# Patient Record
(demographics unavailable — no encounter records)

---

## 2025-07-02 NOTE — HISTORY OF PRESENT ILLNESS
[Patient reported PAP Smear was normal] : Patient reported PAP Smear was normal [Y] : Patient is sexually active [N] : Patient denies prior pregnancies [Hx HPV] : history of HPV [Currently Active] : currently active [FreeTextEntry1] : 26 y/o presented for follow up from hospitalization from [facility] For TOA vs endometriosis s/p IV antibiotics anf 14 days of oral antibiotics doxy/ flagyl at the time pt presented with upper abdominal pain no fever no pelvic pain  pt states she had same type of pain August 2024 was evaluate at [facility] imaging not done was prescribed muscle relaxant / April 2025 never went to the hospital and then in June again WBC on admission 14 went down to 7  neg cultures on evaluation today no pelvic pain   [PapSmeardate] : 1/2025 [LMPDate] : 6/24/25 [PGHxTotal] : 0 [TextBox_27] : MRI: 6/22/25, CT: 6/19/25, SONO: 6/19/25 [TextBox_36] : hx of colpo-low grade per pt [TextBox_9] : 11 [TextBox_13] : 28 [TextBox_15] : 3-4

## 2025-07-02 NOTE — PLAN
[FreeTextEntry1] : TOA vs endometriosis will start low dose OCP and repeat Sono for change in adnexal mass  f/u in 2 wks

## 2025-07-21 NOTE — HISTORY OF PRESENT ILLNESS
[Patient reported PAP Smear was normal] : Patient reported PAP Smear was normal [Y] : Patient is sexually active [N] : Patient denies prior pregnancies [Hx HPV] : history of HPV [Currently Active] : currently active [FreeTextEntry1] : 25 year old F, former patient of Dr. Josh Sheffield, presenting for follow up. She was initially seen in the ED where she had a PUS/CT A/P/MRI pelvis performed and diagnosed with suspected TOA. She was appropriated treated and has completed all antibiotics. She saw Dr. Dorantes in the outpatient setting and was started on CHC -- suspicion for endometriosis given clinical presentation. She is taking her CHC as prescribed. She states she is not having fevers, chills, abdominopelvic pain, no GI or bladder symptoms, no pain with intercourse, menses are regular. ROS negative.   ACC: [id number]     EXAM:  CT ABDOMEN AND PELVIS IC   ORDERED BY: SUELLEN DUVALL PROCEDURE DATE:  06/19/2025  INTERPRETATION:  CLINICAL INFORMATION: Epigastric pain. Suprapubic. COMPARISON: None.  CONTRAST/COMPLICATIONS: IV Contrast: Omnipaque 350  80 cc administered   20 cc discarded Oral Contrast: NONE.  PROCEDURE: CT of the Abdomen and Pelvis was performed. Sagittal and coronal reformats were performed.  FINDINGS: LOWER CHEST: Linear atelectasis or scarring at the lung bases.  LIVER: Within normal limits. BILE DUCTS: Normal caliber. GALLBLADDER: Distended with a normal caliber wall. SPLEEN: Within normal limits. PANCREAS: Within normal limits. ADRENALS: Within normal limits. KIDNEYS/URETERS: Within normal limits.  BLADDER: Unremarkable. REPRODUCTIVE ORGANS: Bilateral adnexal septated cystic masses measuring 6.5 x 4.4 cm in the right and 7.5 x 4.8 cm on the left (series 2 image 25) which are directly adjacent to one another in the midline of the pelvis.  BOWEL: No bowel obstruction. Appendix is normal. PERITONEUM/RETROPERITONEUM: Small amount of ascites including the inferior aspect of the liver and anterior aspect of the pelvis with infiltration of the fat in the anterior aspect of the pelvis VESSELS: Abdominal aorta normal in caliber. LYMPH NODES: No lymphadenopathy. ABDOMINAL WALL: Unremarkable. BONES: No aggressive osseous lesion  IMPRESSION: Bilateral adjacent septated adnexal masses measuring 6.5 x 4.4 cm on the right and 7.5 x 4.8 cm on the left with infiltration of the fat at the anterior aspect of the pelvis and small amount of ascites suspicious for pelvic inflammatory disease with bilateral tubo-ovarian abscesses; a pelvic ultrasound is recommended for further evaluation. The findings were discussed with Dr. Alatorre on 6/19/2025 8:32 PM --- End of Report ---  ACC: [id number]     EXAM:  US TRANSVAGINAL   ORDERED BY: LORIN GIL ACC: [id number]     EXAM:  US PELVIC COMPLETE   ORDERED BY: LORIN GIL PROCEDURE DATE:  06/19/2025 INTERPRETATION:  CLINICAL INFORMATION: Pelvic pain. Question of the TOA on earlier CT abdomen and pelvis. COMPARISON: Same day CT abdomen and pelvis. TECHNIQUE: Endovaginal and transabdominal pelvic sonogram. Color and Spectral Doppler was performed.  FINDINGS: Uterus: 7.4 x 5.4 x 4.8 cm. Within normal limits. Endometrium: 3 mm. Within normal limits.  Right ovary: Complex structures in the right adnexa demonstrating fluid fluid level corresponding to lesion seen on earlier CT. Several images suggest that this may represent dilated fallopian tube. The structure incompletely imaged due to its superior location, and thus difficult to completely included on transvaginal images. At the level scanned this measures approximately 6.1 x 4.7 cm.  Left ovary: Not visualized. Bowel gas limits evaluation of the left adnexa.  Fluid: Mild complex free fluid in the right adnexa..  IMPRESSION: Incomplete examination due to bowel gas and superior location of the ovaries. The left ovary is not visualized.  Partially imaged complex right ovarian lesion demonstrating a fluid fluid level. Findings may represent hematosalpinx/endometriosis with a small amount of right adnexal complex free fluid possibly representing hemoperitoneum. Differential includes tubo-ovarian abscesses. Consider pelvic MRI for more detailed evaluation. --- End of Report ---    ACC: [id number]     EXAM:  MR PELVIS WAW IC   ORDERED BY: ASAEL MURILLO ACC: [id number]     EXAM:  MR ABDOMEN WAW IC   ORDERED BY: TEMO OCONNOR PROCEDURE DATE:  06/22/2025 INTERPRETATION:  CLINICAL INFORMATION: Bilateral tubo-ovarian abscesses, on IV antibiotics. Surveillance. COMPARISON: CT of the abdomen and pelvis dated 06/19/2025 and pelvic ultrasound dated 06/19/2025  CONTRAST/COMPLICATIONS: IV Contrast: Gadavist  6 cc administered   1.5 cc discarded Oral Contrast: NONE.  PROCEDURE: MRI of the abdomen and pelvis was performed.  FINDINGS: LOWER CHEST: Linear atelectasis in the left lower lobe  LIVER: Within normal limits. BILE DUCTS: Normal caliber. GALLBLADDER: Within normal limits. SPLEEN: Within normal limits. PANCREAS: Within normal limits. ADRENALS: Within normal limits. KIDNEYS/URETERS: Within normal limits.  BLADDER: Within normal limits. REPRODUCTIVE ORGANS: * Slightly improved but persistent enlargement of bilateral adnexa with complex multilocular structures presumed to represent bilateral tubo-ovarian abscesses in light of the provided history. Both predominantly composed of T1 hyperintense contents with fluid levels/T2 shading. While findings may be consistent with proteinaceous material in the setting of abscesses, imaging differential would include endometriomas/hematosalpinx. A combination of findings could also be considered. Dominant component in the right adnexa measures 4.6 x 4.4 cm (9, 15), previously 5.0 x 4.3 cm when remeasured. Dominant multilocular component in the left adnexa measures 6.9 x 4.2 cm (9, 13), previously 7.1 x 3.8 cm. * Tethering of both adnexa medially to the posterior uterine margin and posteriorly towards the anterior wall of the upper rectum where there is spiculated T2 hypointense tissue with T1 hyperintense foci. Suspect endometriotic implants/pelvic adhesions * Uterus is normal in size and deviated into the left anterior hemipelvis.  BOWEL: No bowel obstruction As above, suspected adhesions between the adnexa and anterior rectal wall. PERITONEUM: Persistent inflammatory infiltration of the anterior pelvic fat new 4.3 x 2.8 cm organizing collection with incomplete rim enhancement. Fluid in the pelvis is also T1 hyperintense which may be due to proteinaceous or hemorrhagic material VESSELS: Within normal limits. RETROPERITONEUM/LYMPH NODES: No lymphadenopathy. ABDOMINAL WALL: Within normal limits. BONES: No aggressive osseous lesion.  IMPRESSION: 1.  Persistent but improved bilateral adnexal enlargement with multilocular structures presumed to represent bilateral tubo-ovarian abscesses in light of the patient's history. Note that differential would include endometrioma/hematosalpinx with or without superimposed infection. 2.  Features of endometriosis with suspected endometriotic implant/pelvic adhesions tethering both adnexa medially to the posterior uterine margin and anterior rectal wall. 3.  Persistent inflammatory infiltration and fluid within the anterior pelvic fat and new 4.3 x 2.8 cm organizing collection also with proteinaceous or hemorrhagic material. --- End of Report ---  EXAM: [id number] - US TRANSVAGINAL  - ORDERED BY: JOSH UVADYEVA PROCEDURE DATE:  07/09/2025 INTERPRETATION:  CLINICAL INFORMATION: TOA vs Endometriosis; LMP: 06/18/2025  COMPARISON: 6/19/2025  TECHNIQUE: Endovaginal pelvic sonogram as per order. Transabdominal pelvic sonogram was also performed as part of our standard protocol.  FINDINGS: Uterus: 7.6 cm x 4.3 cm x 5.4 cm. Endometrium: 13 mm.  Right ovary: 4.8 cm x 3.6 cm x 4.7 cm. 3.0 x 2.6 x 2.9 cm cyst within septation. Left ovary: 7.3 cm x 5.9 cm x 7.1 cm. 5.0 x 4.0 x 3.7 cm cyst with low-level echo and septation.  Fluid: None.  IMPRESSION: Complex bilateral adnexal cysts measuring up to 3.0 cm in the right and 5 cm in the left. --- End of Report --- [PapSmeardate] : 1/2025 [LMPDate] : 6/24/25 [PGHxTotal] : 0 [TextBox_27] : MRI: 6/22/25, CT: 6/19/25, SONO: 7/9/25 [TextBox_36] : hx of colpo-low grade per pt [TextBox_9] : 11 [TextBox_13] : 28 [TextBox_15] : 3-4

## 2025-07-21 NOTE — DISCUSSION/SUMMARY
[FreeTextEntry1] : - Bilateral complex adnexal masses have decreased in size from time of last imaging to now.  - Will have her continue the CHC and re-evaluate in 3 months with PUS  RTO for results

## 2025-07-21 NOTE — PHYSICAL EXAM
[Appropriately responsive] : appropriately responsive [Alert] : alert [No Acute Distress] : no acute distress [Regular Rate Rhythm] : regular rate rhythm [Oriented x3] : oriented x3 [FreeTextEntry5] : Breahting comfortably on room air

## 2025-07-21 NOTE — HISTORY OF PRESENT ILLNESS
[Patient reported PAP Smear was normal] : Patient reported PAP Smear was normal [Y] : Patient is sexually active [N] : Patient denies prior pregnancies [Hx HPV] : history of HPV [Currently Active] : currently active [FreeTextEntry1] : 25 year old F, former patient of Dr. Josh Sheffield, presenting for follow up. She was initially seen in the ED where she had a PUS/CT A/P/MRI pelvis performed and diagnosed with suspected TOA. She was appropriated treated and has completed all antibiotics. She saw Dr. Dorantes in the outpatient setting and was started on CHC -- suspicion for endometriosis given clinical presentation. She is taking her CHC as prescribed. She states she is not having fevers, chills, abdominopelvic pain, no GI or bladder symptoms, no pain with intercourse, menses are regular. ROS negative.   ACC: [id number]     EXAM:  CT ABDOMEN AND PELVIS IC   ORDERED BY: SUELLEN DUVALL PROCEDURE DATE:  06/19/2025  INTERPRETATION:  CLINICAL INFORMATION: Epigastric pain. Suprapubic. COMPARISON: None.  CONTRAST/COMPLICATIONS: IV Contrast: Omnipaque 350  80 cc administered   20 cc discarded Oral Contrast: NONE.  PROCEDURE: CT of the Abdomen and Pelvis was performed. Sagittal and coronal reformats were performed.  FINDINGS: LOWER CHEST: Linear atelectasis or scarring at the lung bases.  LIVER: Within normal limits. BILE DUCTS: Normal caliber. GALLBLADDER: Distended with a normal caliber wall. SPLEEN: Within normal limits. PANCREAS: Within normal limits. ADRENALS: Within normal limits. KIDNEYS/URETERS: Within normal limits.  BLADDER: Unremarkable. REPRODUCTIVE ORGANS: Bilateral adnexal septated cystic masses measuring 6.5 x 4.4 cm in the right and 7.5 x 4.8 cm on the left (series 2 image 25) which are directly adjacent to one another in the midline of the pelvis.  BOWEL: No bowel obstruction. Appendix is normal. PERITONEUM/RETROPERITONEUM: Small amount of ascites including the inferior aspect of the liver and anterior aspect of the pelvis with infiltration of the fat in the anterior aspect of the pelvis VESSELS: Abdominal aorta normal in caliber. LYMPH NODES: No lymphadenopathy. ABDOMINAL WALL: Unremarkable. BONES: No aggressive osseous lesion  IMPRESSION: Bilateral adjacent septated adnexal masses measuring 6.5 x 4.4 cm on the right and 7.5 x 4.8 cm on the left with infiltration of the fat at the anterior aspect of the pelvis and small amount of ascites suspicious for pelvic inflammatory disease with bilateral tubo-ovarian abscesses; a pelvic ultrasound is recommended for further evaluation. The findings were discussed with Dr. Alatorre on 6/19/2025 8:32 PM --- End of Report ---  ACC: [id number]     EXAM:  US TRANSVAGINAL   ORDERED BY: LROIN GIL ACC: [id number]     EXAM:  US PELVIC COMPLETE   ORDERED BY: LORIN GIL PROCEDURE DATE:  06/19/2025 INTERPRETATION:  CLINICAL INFORMATION: Pelvic pain. Question of the TOA on earlier CT abdomen and pelvis. COMPARISON: Same day CT abdomen and pelvis. TECHNIQUE: Endovaginal and transabdominal pelvic sonogram. Color and Spectral Doppler was performed.  FINDINGS: Uterus: 7.4 x 5.4 x 4.8 cm. Within normal limits. Endometrium: 3 mm. Within normal limits.  Right ovary: Complex structures in the right adnexa demonstrating fluid fluid level corresponding to lesion seen on earlier CT. Several images suggest that this may represent dilated fallopian tube. The structure incompletely imaged due to its superior location, and thus difficult to completely included on transvaginal images. At the level scanned this measures approximately 6.1 x 4.7 cm.  Left ovary: Not visualized. Bowel gas limits evaluation of the left adnexa.  Fluid: Mild complex free fluid in the right adnexa..  IMPRESSION: Incomplete examination due to bowel gas and superior location of the ovaries. The left ovary is not visualized.  Partially imaged complex right ovarian lesion demonstrating a fluid fluid level. Findings may represent hematosalpinx/endometriosis with a small amount of right adnexal complex free fluid possibly representing hemoperitoneum. Differential includes tubo-ovarian abscesses. Consider pelvic MRI for more detailed evaluation. --- End of Report ---    ACC: [id number]     EXAM:  MR PELVIS WAW IC   ORDERED BY: ASAEL MURILLO ACC: [id number]     EXAM:  MR ABDOMEN WAW IC   ORDERED BY: TEMO OCONNOR PROCEDURE DATE:  06/22/2025 INTERPRETATION:  CLINICAL INFORMATION: Bilateral tubo-ovarian abscesses, on IV antibiotics. Surveillance. COMPARISON: CT of the abdomen and pelvis dated 06/19/2025 and pelvic ultrasound dated 06/19/2025  CONTRAST/COMPLICATIONS: IV Contrast: Gadavist  6 cc administered   1.5 cc discarded Oral Contrast: NONE.  PROCEDURE: MRI of the abdomen and pelvis was performed.  FINDINGS: LOWER CHEST: Linear atelectasis in the left lower lobe  LIVER: Within normal limits. BILE DUCTS: Normal caliber. GALLBLADDER: Within normal limits. SPLEEN: Within normal limits. PANCREAS: Within normal limits. ADRENALS: Within normal limits. KIDNEYS/URETERS: Within normal limits.  BLADDER: Within normal limits. REPRODUCTIVE ORGANS: * Slightly improved but persistent enlargement of bilateral adnexa with complex multilocular structures presumed to represent bilateral tubo-ovarian abscesses in light of the provided history. Both predominantly composed of T1 hyperintense contents with fluid levels/T2 shading. While findings may be consistent with proteinaceous material in the setting of abscesses, imaging differential would include endometriomas/hematosalpinx. A combination of findings could also be considered. Dominant component in the right adnexa measures 4.6 x 4.4 cm (9, 15), previously 5.0 x 4.3 cm when remeasured. Dominant multilocular component in the left adnexa measures 6.9 x 4.2 cm (9, 13), previously 7.1 x 3.8 cm. * Tethering of both adnexa medially to the posterior uterine margin and posteriorly towards the anterior wall of the upper rectum where there is spiculated T2 hypointense tissue with T1 hyperintense foci. Suspect endometriotic implants/pelvic adhesions * Uterus is normal in size and deviated into the left anterior hemipelvis.  BOWEL: No bowel obstruction As above, suspected adhesions between the adnexa and anterior rectal wall. PERITONEUM: Persistent inflammatory infiltration of the anterior pelvic fat new 4.3 x 2.8 cm organizing collection with incomplete rim enhancement. Fluid in the pelvis is also T1 hyperintense which may be due to proteinaceous or hemorrhagic material VESSELS: Within normal limits. RETROPERITONEUM/LYMPH NODES: No lymphadenopathy. ABDOMINAL WALL: Within normal limits. BONES: No aggressive osseous lesion.  IMPRESSION: 1.  Persistent but improved bilateral adnexal enlargement with multilocular structures presumed to represent bilateral tubo-ovarian abscesses in light of the patient's history. Note that differential would include endometrioma/hematosalpinx with or without superimposed infection. 2.  Features of endometriosis with suspected endometriotic implant/pelvic adhesions tethering both adnexa medially to the posterior uterine margin and anterior rectal wall. 3.  Persistent inflammatory infiltration and fluid within the anterior pelvic fat and new 4.3 x 2.8 cm organizing collection also with proteinaceous or hemorrhagic material. --- End of Report ---  EXAM: [id number] - US TRANSVAGINAL  - ORDERED BY: JOSH UVADYEVA PROCEDURE DATE:  07/09/2025 INTERPRETATION:  CLINICAL INFORMATION: TOA vs Endometriosis; LMP: 06/18/2025  COMPARISON: 6/19/2025  TECHNIQUE: Endovaginal pelvic sonogram as per order. Transabdominal pelvic sonogram was also performed as part of our standard protocol.  FINDINGS: Uterus: 7.6 cm x 4.3 cm x 5.4 cm. Endometrium: 13 mm.  Right ovary: 4.8 cm x 3.6 cm x 4.7 cm. 3.0 x 2.6 x 2.9 cm cyst within septation. Left ovary: 7.3 cm x 5.9 cm x 7.1 cm. 5.0 x 4.0 x 3.7 cm cyst with low-level echo and septation.  Fluid: None.  IMPRESSION: Complex bilateral adnexal cysts measuring up to 3.0 cm in the right and 5 cm in the left. --- End of Report --- [PapSmeardate] : 1/2025 [LMPDate] : 6/24/25 [PGHxTotal] : 0 [TextBox_27] : MRI: 6/22/25, CT: 6/19/25, SONO: 7/9/25 [TextBox_36] : hx of colpo-low grade per pt [TextBox_9] : 11 [TextBox_13] : 28 [TextBox_15] : 3-4